# Patient Record
Sex: FEMALE | Race: WHITE
[De-identification: names, ages, dates, MRNs, and addresses within clinical notes are randomized per-mention and may not be internally consistent; named-entity substitution may affect disease eponyms.]

---

## 2017-03-09 ENCOUNTER — HOSPITAL ENCOUNTER (OUTPATIENT)
Dept: HOSPITAL 47 - LABPAT | Age: 62
Discharge: HOME | End: 2017-03-09
Payer: COMMERCIAL

## 2017-03-09 DIAGNOSIS — Z01.818: Primary | ICD-10-CM

## 2017-03-09 LAB
ALP SERPL-CCNC: 82 U/L (ref 38–126)
ALT SERPL-CCNC: 36 U/L (ref 9–52)
ANION GAP SERPL CALC-SCNC: 11 MMOL/L
APTT BLD: 24.5 SEC (ref 22–30)
AST SERPL-CCNC: 17 U/L (ref 14–36)
BASOPHILS # BLD AUTO: 0.1 K/UL (ref 0–0.2)
BASOPHILS NFR BLD AUTO: 1 %
BUN SERPL-SCNC: 27 MG/DL (ref 7–17)
CALCIUM SPEC-MCNC: 9.9 MG/DL (ref 8.4–10.2)
CH: 31.5
CHCM: 33.1
CHLORIDE SERPL-SCNC: 103 MMOL/L (ref 98–107)
CO2 SERPL-SCNC: 24 MMOL/L (ref 22–30)
EOSINOPHIL # BLD AUTO: 0.3 K/UL (ref 0–0.7)
EOSINOPHIL NFR BLD AUTO: 4 %
ERYTHROCYTE [DISTWIDTH] IN BLOOD BY AUTOMATED COUNT: 4.04 M/UL (ref 3.8–5.4)
ERYTHROCYTE [DISTWIDTH] IN BLOOD: 14.8 % (ref 11.5–15.5)
GLUCOSE SERPL-MCNC: 95 MG/DL (ref 74–99)
HCT VFR BLD AUTO: 38.7 % (ref 34–46)
HDW: 2.33
HGB BLD-MCNC: 12.7 GM/DL (ref 11.4–16)
INR PPP: 1.1 (ref ?–1.1)
LUC NFR BLD AUTO: 4 %
LYMPHOCYTES # SPEC AUTO: 1.4 K/UL (ref 1–4.8)
LYMPHOCYTES NFR SPEC AUTO: 22 %
MCH RBC QN AUTO: 31.4 PG (ref 25–35)
MCHC RBC AUTO-ENTMCNC: 32.8 G/DL (ref 31–37)
MCV RBC AUTO: 95.7 FL (ref 80–100)
MONOCYTES # BLD AUTO: 0.6 K/UL (ref 0–1)
MONOCYTES NFR BLD AUTO: 9 %
NEUTROPHILS # BLD AUTO: 3.8 K/UL (ref 1.3–7.7)
NEUTROPHILS NFR BLD AUTO: 60 %
NON-AFRICAN AMERICAN GFR(MDRD): 57
PH UR: 5.5 [PH] (ref 5–8)
POTASSIUM SERPL-SCNC: 4.2 MMOL/L (ref 3.5–5.1)
PROT SERPL-MCNC: 6.8 G/DL (ref 6.3–8.2)
PT BLD: 10.9 SEC (ref 9–12)
SODIUM SERPL-SCNC: 138 MMOL/L (ref 137–145)
SP GR UR: 1.01 (ref 1–1.03)
UA BILLING (MACRO VS. MICRO): (no result)
UROBILINOGEN UR QL STRIP: <2 MG/DL (ref ?–2)
WBC # BLD AUTO: 0.25 10*3/UL
WBC # BLD AUTO: 6.4 K/UL (ref 3.8–10.6)
WBC (PEROX): 6.64

## 2017-03-09 PROCEDURE — 87070 CULTURE OTHR SPECIMN AEROBIC: CPT

## 2017-03-09 PROCEDURE — 81003 URINALYSIS AUTO W/O SCOPE: CPT

## 2017-03-09 PROCEDURE — 85730 THROMBOPLASTIN TIME PARTIAL: CPT

## 2017-03-09 PROCEDURE — 80053 COMPREHEN METABOLIC PANEL: CPT

## 2017-03-09 PROCEDURE — 85610 PROTHROMBIN TIME: CPT

## 2017-03-09 PROCEDURE — 85025 COMPLETE CBC W/AUTO DIFF WBC: CPT

## 2017-03-09 PROCEDURE — 36415 COLL VENOUS BLD VENIPUNCTURE: CPT

## 2017-03-14 NOTE — CONS
DATE OF CONSULTATION:  



Mrs. Villareal is a 61-year-old female. She is scheduled for total right 

knee arthroplasty by Dr. Rene Espinoza  on March 21 at [facility]. Patient was seen in consultation. The patient does have 

history of hypertension and hyperlipidemia. There is also a history of 

an upper GI bleed back last September from a duodenal ulcer and 

history in the remote past of pseudotumor cerebri for which she had a 

previous lumbar peritoneal shunt placed. She does have underlying 

obesity.  



Her home medications include:

1. Hydrochlorothiazide 50 mg daily.

2. Protonix 40mg twice a day. 

3. Lisinopril 5 mg daily. 

4. Pravastatin 80 mg. 

5. Restasis eye drops 0.05% emulsion in both eyes twice a day. 



ALLERGIES: No known allergies. 



REVIEW OF SYSTEMS: Negative for any unusual headache, visual 

disturbances, nausea, vomiting. No urinary or bowel symptoms. No 

hematochezia. No unusual leg edema. Most of her symptoms related to 

the pain in the right knee and difficulty walking  



Previous surgical history includes a hysterectomy in the past, optic 

nerve decompression and a lumbar peritoneal shunt for pseudotumor cerebri.  



Family history is positive for coronary artery disease with both 

parents. There is also a history of diabetes and lymphoma in the 

father.  



SOCIAL HISTORY: She is a former smoker many years ago. No history of 

any excessive alcohol intake. She does live locally in the area and 

has employment driving a van.  



On physical examination, she is alert and oriented, in no acute 

distress. Her blood pressure is 144/80. She does weigh 223 pounds with 

a BMI of 40.9, pulse was 80 and regular.  

HEENT: Unremarkable. No carotid bruits, adenopathy or thyromegaly. 

Lungs were clear. Heart tones are regular without murmurs or rubs. 

Breasts and pelvic exam deferred. 

ABDOMEN: Obese, but soft and nontender without organomegaly or masses. 

Extremities revealed some trace pedal edema and degenerative changes 

about the right knee.  

Neurologically, she is intact. Cranial nerves intact. No focal 

neurological weakness.  



The patient did have laboratory values done, which revealed a blood 

sugar of 95, sodium 138, potassium 4.2. Her CO2 content was 24. BUN 

elevated at 27 with creatinine 1.0, giving her GFR of 57. Liver 

function tests were unremarkable. Albumin 4.2. INR is 1.1. Her white 

count of 6.4, with hemoglobin 12.7 and a platelet count 249. PTT was 

24.5. Urinalysis revealed negative leukocyte esterase. Nasal swab for 

MRSA is in progress at this dictation.  



Overall impression at this point: Patient was severe right knee 

derangement for surgery scheduled on the 21st of March. She does have 

a history of hypertension, underlying obesity, hyperlipidemia. Other 

past medical history with a previous history of a gastric ulcer last 

year likely NSAID induced at that time and remote history of 

pseudotumor cerebri for which she had a shunt placed. But at this 

point, cardiovascular status appears stable. I see no definite 

contraindication to planned surgery. She is to hold her 

hydrochlorothiazide the morning of surgery, although she usually takes 

it the night before. She can take a dose of Protonix and lisinopril 

with a small sip of water in the morning of surgery. Please call if 

any questions or concerns.  



TIFF

## 2017-03-21 ENCOUNTER — HOSPITAL ENCOUNTER (INPATIENT)
Dept: HOSPITAL 47 - 2ORMAIN | Age: 62
LOS: 2 days | Discharge: HOME | DRG: 470 | End: 2017-03-23
Payer: COMMERCIAL

## 2017-03-21 VITALS — BODY MASS INDEX: 40.6 KG/M2

## 2017-03-21 DIAGNOSIS — I10: ICD-10-CM

## 2017-03-21 DIAGNOSIS — Z79.899: ICD-10-CM

## 2017-03-21 DIAGNOSIS — E66.9: ICD-10-CM

## 2017-03-21 DIAGNOSIS — E78.5: ICD-10-CM

## 2017-03-21 DIAGNOSIS — Z87.891: ICD-10-CM

## 2017-03-21 DIAGNOSIS — Z87.11: ICD-10-CM

## 2017-03-21 DIAGNOSIS — M17.11: Primary | ICD-10-CM

## 2017-03-21 PROCEDURE — 0SRC0J9 REPLACEMENT OF RIGHT KNEE JOINT WITH SYNTHETIC SUBSTITUTE, CEMENTED, OPEN APPROACH: ICD-10-PCS

## 2017-03-21 PROCEDURE — 85025 COMPLETE CBC W/AUTO DIFF WBC: CPT

## 2017-03-21 PROCEDURE — 88300 SURGICAL PATH GROSS: CPT

## 2017-03-21 RX ADMIN — POTASSIUM CHLORIDE SCH MLS: 14.9 INJECTION, SOLUTION INTRAVENOUS at 15:10

## 2017-03-21 RX ADMIN — DOCUSATE SODIUM AND SENNOSIDES SCH EACH: 50; 8.6 TABLET ORAL at 20:34

## 2017-03-21 RX ADMIN — CYCLOSPORINE SCH DROPS: 0.5 EMULSION OPHTHALMIC at 20:36

## 2017-03-21 RX ADMIN — POTASSIUM CHLORIDE SCH MLS: 14.9 INJECTION, SOLUTION INTRAVENOUS at 12:52

## 2017-03-21 RX ADMIN — CEFAZOLIN SCH MLS/HR: 330 INJECTION, POWDER, FOR SOLUTION INTRAMUSCULAR; INTRAVENOUS at 17:34

## 2017-03-21 RX ADMIN — ASPIRIN 325 MG ORAL TABLET SCH MG: 325 PILL ORAL at 20:36

## 2017-03-21 NOTE — P.ONQ
Anesthesiology Proc Note - PNB





- Peripheral Nerve Block Performed


  ** Adductor Canal


Time Out Performed: Yes


Procedure Start Time: 13:13


Procedure Stop Time: 13:20


Indication: Acute Post-Operative Pain, Analgesia


Sedation Type: Sedate with meaningful contact maintained


Preparation: Sterile Prep


Position: Supine


Catheter: Indwelling


Needle Types: On-Q


Needle Size: 100mm (4")


Needle Gauge: 18


Technique: Ultrasound


Injectate: 0.5% Ropivacaine (see comment for volume)


Blood Aspirated: No


Pain Paresthesia on Injection Noted: No


Resistance on Injection: Normal


Events: Uneventful and Well Tolerated

## 2017-03-21 NOTE — P.OP
Date of Procedure: 03/21/17


Preoperative Diagnosis: 


Severe osteoarthritis right knee


Postoperative Diagnosis: 


Severe osteoarthritis right knee


Procedure(s) Performed: 


Right total knee arthroplasty


Implants: 


Smith and Nephew Oxinium femoral component size 4 right


Smith & Nephew Olivia II right nonporous tibial baseplate size 3


Smith & Nephew size 11 mm Legion XLPE dished articular insert, size 3-4


Smith & Nephew Olivia II resurfacing patellar component, 29 mm


All components were cemented using Maribeth bone cement..


The articulation is ceramic on polyethylene.


Anesthesia: spinal


Surgeon: Rene Espinoza


Assistant #1: Renee Francois


Estimated Blood Loss (ml): 50


Pathology: other (Bone and cartilage)


Condition: stable


Disposition: PACU


Indications for Procedure: 


After failure of conservative treatment we discussed the surgical and 

nonsurgical treatment options at length.  Patient wishes to proceed with a 

total knee arthroplasty.  Complications specific to this procedure were 

discussed at length, including but not limited to infection, bleeding, stiffness

, and nerve injury.  Patient is aware of all these complications and informed 

consent was obtained


Operative Findings: 


The operative findings are consistent with severe osteoarthritis of the right 

knee


Description of Procedure: 


Patient was seen in the preoperative area consent was reviewed and operative 

site was marked with a skin marker.  An adductor canal pain catheter was placed 

by anesthesia in the preoperative area.  Patient was then brought to the 

operating room and given preoperative antibiotics intravenously. A spinal 

anesthetic was administered by the anesthesia department.  A Ceron catheter was 

then placed by the nursing staff.  A tourniquet was placed on the upper thigh 

and the lower extremity was prepped and draped in usual sterile fashion.  A 

gram of transexamic acid was given.  A universal timeout was then performed 

which confirmed the patient's name, surgical site, ALLERGIES, and consent.





The lower extremity was then exsanguinated and tourniquet was inflated to 250 

mmHg.  A standard and anterior midline approach to the knee was performed.  The 

skin and subcutaneous tissue was dissected down to the patellar tendon.  A 

medial parapatellar arthrotomy was then performed.  The knee was then extended, 

the patellar was everted, and the knee was again flexed.  Anterior horns of 

both menisci were excised, and a release was performed to the posterior medial 

aspect of the knee.  On gross visual inspection, there was complete loss of 

articular cartilage in the medial and patellofemoral joint spaces.  There was 

also significant cartilage damage in the lateral compartment.  There were 

multiple periarticular osteophytes which were then removed with a Ronguer.  The 

femoral canal was then opened with the appropriate drill, and the 

intramedullary femoral cutting guide was then placed and set for 4 of valgus.  

The distal femoral cutting block was then pinned in place, and the distal femur 

was then cut.  The cutting block was then removed and the cut was checked for 

flatness.  Next, the sizing guide was then placed and set for 3 external 

rotation based off of the epicondylar axis and Whitesides line.  After the 

femur was sized, the appropriate 4-in-1 cutting block was then pinned in place.

  The anterior condyles were cut without notching.  The posterior and chamfer 

cuts were performed while protecting the collateral ligaments.  The cutting 

block was then removed, and the femoral canal was plugged with autologous bone.





Attention was then directed to the tibia.  The remaining ACL was removed with a 

Ronguer, and the tibia was then gently subluxed forward with a large bent knee 

retractor.  Any remaining menisci was excised.  The posterior lateral corner 

was cauterized in order to cauterize the lateral geniculate artery.  The extra 

medullary tibial cutting guide was then placed, set for the appropriate rotation

, slope, and depth of resection.  The proximal tibia cutting guide was then 

pinned in place.  Proximal tibia was then cut and sized.  Next trials were then 

placed with the appropriate-sized insert.  The knee was able to fully extend 

and flex to 130 and was stable throughout all range of motion.  The knee was 

then extended, patella everted.  Patella was then measured, and then using an 

osteotomy guide, the patella was cut at the appropriate level.  The patella was 

then measured and drilled and the patella trial was then placed.  The knee was 

then taken through range of motion with the patella trial and the patella 

tracked normally.  The knee was then extended patella trial was then removed 

and the patella was everted.  Knee was then flexed and lug holes were drilled 

through the femoral trial and the femoral trial was then removed.  The tibial 

was then exposed, and the tibial broach guide was then pinned in place after it 

was set for the appropriate rotation to allow for the most coverage without 

overhang.  The tibia was then reamed and broached.





The cut surfaces of bone were then irrigated with pulsatile lavage.  The 

posterior structures were injected with the ropivacaine solution.  The knee was 

also irrigated with Irrisept solution.  The components were then opened, the 

cement was mixed, and the components were then cemented in place.  The cement 

was allowed to harden with the knee in full extension.  While the cement was 

hardening, the remaining soft tissues were then injected with a ropivacaine 

solution, which consisted of 246.25 mg of ropivacaine, 0.5 mg of epinephrine, 

30 mg of Toradol, 80 g of clonidine, and 48.45 mL of sterile water, for a 

total of 100 mL of fluid injected. After the cemented hardened.  The tourniquet 

was released, and hemostasis was obtained.  A second gram of transexamic acid 

was given.  The knee was again irrigated.  The knee was again taken through 

range of motion and found to be stable throughout all range of motion of 0-130

, and the patella tracked normally.  The fascia was then closed with #2 strata 

fix suture.  The subcutaneous tissue was closed with 3-0 Vicryl and 3-0 strata 

fix.  Dermabond tape was used for the skin and placed with the knee in flexion. 

The patient was placed in a sterile dressing.  Patient was then transferred to 

recovery room in stable condition.





The assistant VIRAL Su was required due the complexity surgery and 

the need for a skilled surgical assistant.  She assisted in positioning, draping

, retraction, and closure of the wound.

## 2017-03-22 LAB
BASOPHILS # BLD AUTO: 0 K/UL (ref 0–0.2)
BASOPHILS NFR BLD AUTO: 0 %
CH: 30.5
CHCM: 32.4
EOSINOPHIL # BLD AUTO: 0 K/UL (ref 0–0.7)
EOSINOPHIL NFR BLD AUTO: 0 %
ERYTHROCYTE [DISTWIDTH] IN BLOOD BY AUTOMATED COUNT: 3.6 M/UL (ref 3.8–5.4)
ERYTHROCYTE [DISTWIDTH] IN BLOOD: 13.6 % (ref 11.5–15.5)
HCT VFR BLD AUTO: 34 % (ref 34–46)
HDW: 2.36
HGB BLD-MCNC: 10.9 GM/DL (ref 11.4–16)
LUC NFR BLD AUTO: 2 %
LYMPHOCYTES # SPEC AUTO: 1 K/UL (ref 1–4.8)
LYMPHOCYTES NFR SPEC AUTO: 9 %
MCH RBC QN AUTO: 30.3 PG (ref 25–35)
MCHC RBC AUTO-ENTMCNC: 32.2 G/DL (ref 31–37)
MCV RBC AUTO: 94.2 FL (ref 80–100)
MONOCYTES # BLD AUTO: 0.7 K/UL (ref 0–1)
MONOCYTES NFR BLD AUTO: 6 %
NEUTROPHILS # BLD AUTO: 10.1 K/UL (ref 1.3–7.7)
NEUTROPHILS NFR BLD AUTO: 84 %
WBC # BLD AUTO: 0.21 10*3/UL
WBC # BLD AUTO: 12.1 K/UL (ref 3.8–10.6)
WBC (PEROX): 13.21

## 2017-03-22 RX ADMIN — HYDROCODONE BITARTRATE AND ACETAMINOPHEN PRN EACH: 5; 325 TABLET ORAL at 15:29

## 2017-03-22 RX ADMIN — HYDROCODONE BITARTRATE AND ACETAMINOPHEN PRN EACH: 5; 325 TABLET ORAL at 10:32

## 2017-03-22 RX ADMIN — HYDROCODONE BITARTRATE AND ACETAMINOPHEN PRN EACH: 5; 325 TABLET ORAL at 05:28

## 2017-03-22 RX ADMIN — PRAVASTATIN SODIUM SCH MG: 80 TABLET ORAL at 07:14

## 2017-03-22 RX ADMIN — CEFAZOLIN SCH: 330 INJECTION, POWDER, FOR SOLUTION INTRAMUSCULAR; INTRAVENOUS at 05:36

## 2017-03-22 RX ADMIN — DOCUSATE SODIUM AND SENNOSIDES SCH: 50; 8.6 TABLET ORAL at 21:24

## 2017-03-22 RX ADMIN — CYCLOSPORINE SCH DROPS: 0.5 EMULSION OPHTHALMIC at 21:26

## 2017-03-22 RX ADMIN — PANTOPRAZOLE SODIUM SCH MG: 40 TABLET, DELAYED RELEASE ORAL at 07:14

## 2017-03-22 RX ADMIN — HYDROCODONE BITARTRATE AND ACETAMINOPHEN PRN EACH: 5; 325 TABLET ORAL at 05:57

## 2017-03-22 RX ADMIN — ASPIRIN 325 MG ORAL TABLET SCH MG: 325 PILL ORAL at 21:26

## 2017-03-22 RX ADMIN — ASPIRIN 325 MG ORAL TABLET SCH MG: 325 PILL ORAL at 08:56

## 2017-03-22 RX ADMIN — MELOXICAM SCH MG: 7.5 TABLET ORAL at 08:56

## 2017-03-22 RX ADMIN — CYCLOSPORINE SCH DROPS: 0.5 EMULSION OPHTHALMIC at 07:15

## 2017-03-22 RX ADMIN — HYDROCODONE BITARTRATE AND ACETAMINOPHEN PRN EACH: 5; 325 TABLET ORAL at 20:46

## 2017-03-22 NOTE — P.PN
Progress Note - Text





The patient is a 62-year-old female who underwent right total knee arthroplasty 

yesterday by Dr. Rene Espinoza.  Please refer to previous consultation.  

Patient does have comorbidities which consist of obesity, hypertension, 

hyperlipidemia.  This morning though she is sitting up in bed.  Alert.  Denies 

any chest pain, shortness of breath or nausea or vomiting.  She states overall 

her pain is controlled.





Her vital signs reveal temperature of 99.4 earlier this morning her pulse 97 

respirations 22.  Blood pressure 107/59 and she was 99% saturated on 2 L nasal 

cannula.


Lung and heart examination is clear and regular.


Abdomen is obese but soft and nontender.


Extremities reveal the presence of varicosities but no marked edema.


Cranial nerves intact.  She is alert.  Oriented.  No focal weakness noted.





Laboratory:


White count 12.1 with a hemoglobin 10.9 and a platelet count of 238.





Impressions and plans:


Patient is doing well first-day postop from the total right knee arthroplasty.  

Her last blood pressure was satisfactory but a little low.  We will hold her 

lisinopril and hydrochlorothiazide.  Can be advanced and progressed as per 

orthopedics.  I will follow.  Call if any questions or concerns.

## 2017-03-22 NOTE — P.DS
Providers


Date of admission: 


03/21/17 11:54





Expected date of discharge: 03/22/17


Attending physician: 


Rene Esipnoza





Consults: 





 





03/21/17 15:36


Consult Physician Routine 


   Consulting Provider: Rolo Mckeon Reason/Comments: medical management


   Do you want consulting provider notified?: Yes











Primary care physician: 


Rolo Mckeon








- Discharge Diagnosis(es)


(1) Status post total right knee replacement


Current Visit: Yes   Status: Acute   





(2) Primary osteoarthritis of right knee


Current Visit: Yes   Status: Acute   


Hospital Course: 





This is a pleasant 62-year-old female who was last seen in our office with 

complaints of right hip pain.  Patient has known history of degenerative 

arthritis of the right hip and presented to discuss options.  After discussion 

consideration the patient elected to proceed with a right total hip 

arthroplasty.  Patient was seen preoperatively medically cleared for surgery by 

her primary care physician.





Patient was admitted to [facility] and underwent right total hip 

arthroplasty.  The procedure was performed without complications or sequelae.  

The patient has done well postoperatively.   Pain has been are reasonably 

controlled.  She's not yet been up with physical therapy.  The patient has no 

new complaints today.  Patient is seen and evaluated at bedside with Dr. Rene Espinoza.  The patient appears comfortable and in no acute distress.  Dressing 

is clean dry and intact.  Incision is fine with no erythema or active drainage.

  Calf is soft and nontender.  The patient has good foot and ankle motion 

without difficulty.  Lower extremities are neurovascularly intact.  The patient 

is orthopedically stable for discharge if she does well with physical therapy 

today


Pertinent Studies: 





 Laboratory Tests











  03/22/17





  06:20


 


WBC  12.1 H


 


RBC  3.60 L


 


Hgb  10.9 L


 


Hct  34.0


 


MCV  94.2


 


MCH  30.3


 


MCHC  32.2


 


RDW  13.6


 


Plt Count  238


 


Neutrophils %  84


 


Lymphocytes %  9


 


Monocytes %  6


 


Eosinophils %  0


 


Basophils %  0


 


Neutrophils #  10.1 H


 


Lymphocytes #  1.0


 


Monocytes #  0.7


 


Eosinophils #  0.0


 


Basophils #  0.0











Patient Condition at Discharge: Good





Plan - Discharge Summary


New Discharge Prescriptions: 


Aspirin 325 mg PO BID #60 tab


Hydrocodone/Acetaminophen [Norco 5-325] 1 - 2 each PO Q6HR PRN #90 tab


 PRN Reason: Pain


Sennosides-Docusate Sodium [Senokot-S] 2 tab PO DAILY #60 tablet


Discharge Medication List





Hydrochlorothiazide [Hydrodiuril] 50 mg PO DAILY 09/19/16 [History]


Lisinopril [Zestril] 5 mg PO DAILY 09/19/16 [History]


Pravastatin Sodium [Pravachol] 80 mg PO DAILY 09/19/16 [History]


cycloSPORINE 0.05% OPHTH SOLN [Restasis] 1 drop BOTH EYES BID 09/19/16 [History]


Pantoprazole Sodium [Protonix] 40 mg PO DAILY 03/15/17 [History]


Aspirin 325 mg PO BID #60 tab 03/22/17 [Rx]


Hydrocodone/Acetaminophen [Norco 5-325] 1 - 2 each PO Q6HR PRN #90 tab 03/22/17 

[Rx]


Sennosides-Docusate Sodium [Senokot-S] 2 tab PO DAILY #60 tablet 03/22/17 [Rx]








Follow up Appointment(s)/Referral(s): 


Rene Espinoza DO [Doctor of Osteopathic Medicine] - 2 Weeks


Activity/Diet/Wound Care/Special Instructions: 


Weightbearing as tolerated with walker


Daily dressing changes


Keep incision clean and dry


Call [facility] with questions or concerns [phone]


Discharge Disposition: HOME WITH HOME HEALTH SERVICES

## 2017-03-22 NOTE — P.PN
Progress Note - Text





3/22  723am


62-year-old female status post total knee replacement by Dr. Rene bell.  

Patient seen this morning and evaluated for pain control, pain score of 2 pain 

on the anterior aspect of the knee,.  Doing well

## 2017-03-23 VITALS
DIASTOLIC BLOOD PRESSURE: 65 MMHG | SYSTOLIC BLOOD PRESSURE: 123 MMHG | TEMPERATURE: 98.3 F | RESPIRATION RATE: 17 BRPM | HEART RATE: 87 BPM

## 2017-03-23 RX ADMIN — PANTOPRAZOLE SODIUM SCH MG: 40 TABLET, DELAYED RELEASE ORAL at 07:19

## 2017-03-23 RX ADMIN — PRAVASTATIN SODIUM SCH MG: 80 TABLET ORAL at 07:18

## 2017-03-23 RX ADMIN — CYCLOSPORINE SCH DROPS: 0.5 EMULSION OPHTHALMIC at 07:19

## 2017-03-23 RX ADMIN — MELOXICAM SCH MG: 7.5 TABLET ORAL at 07:18

## 2017-03-23 RX ADMIN — POTASSIUM CHLORIDE SCH: 14.9 INJECTION, SOLUTION INTRAVENOUS at 02:49

## 2017-03-23 RX ADMIN — ASPIRIN 325 MG ORAL TABLET SCH MG: 325 PILL ORAL at 07:19

## 2017-03-23 RX ADMIN — HYDROCODONE BITARTRATE AND ACETAMINOPHEN PRN EACH: 5; 325 TABLET ORAL at 01:39

## 2017-03-23 RX ADMIN — HYDROCODONE BITARTRATE AND ACETAMINOPHEN PRN EACH: 5; 325 TABLET ORAL at 07:00

## 2017-03-23 NOTE — P.PN
Progress Note - Text





The patient is a 62-year-old female underwent right total knee arthroplasty by 

Dr. Rene Espinoza 2 days previous.  Patient is sitting up in bed, alert.  No 

chest pain or shortness of breath.  No nausea or vomiting.  Surgical site pain 

appears to be controlled.





Vital signs this morning reveal temperature 98.3 with a pulse of 87 

respirations 17.  Blood pressure is 123/65 and she is 95% saturated on room air.


Lung and heart exam was clear and regular.


Abdomen is nontender.


No unusual distal edema in the lower extremities or calf tenderness.


She is alert and oriented.  Cranial nerves intact.  No focal weakness noted.





Impressions and plans:


This lady doing well post left knee arthroplasty.  Anticipating discharge home 

today.  Patient may resume her home medications and she also monitors her blood 

pressures at home.  She can call office if any questions concerns or problems 

should arise.

## 2017-03-23 NOTE — P.PN
Progress Note - Text





This is a 62-year-old female who is status post total knee arthroplasty.  She 

is doing well from an orthopedic standpoint stay.  Her discharge was held 

yesterday for pain management.  She may be discharged to home today.  Please 

see previous discharge summary and orders.

## 2017-10-17 ENCOUNTER — HOSPITAL ENCOUNTER (OUTPATIENT)
Dept: HOSPITAL 47 - LABPAT | Age: 62
Discharge: HOME | End: 2017-10-17
Payer: COMMERCIAL

## 2017-10-17 DIAGNOSIS — Z01.810: Primary | ICD-10-CM

## 2017-10-17 DIAGNOSIS — Z01.812: ICD-10-CM

## 2017-10-17 LAB
ALP SERPL-CCNC: 87 U/L (ref 38–126)
ALT SERPL-CCNC: 32 U/L (ref 9–52)
ANION GAP SERPL CALC-SCNC: 12 MMOL/L
APTT BLD: 24.7 SEC (ref 22–30)
AST SERPL-CCNC: 16 U/L (ref 14–36)
BUN SERPL-SCNC: 28 MG/DL (ref 7–17)
CALCIUM SPEC-MCNC: 9.5 MG/DL (ref 8.4–10.2)
CH: 31.8
CHCM: 33
CHLORIDE SERPL-SCNC: 102 MMOL/L (ref 98–107)
CO2 SERPL-SCNC: 24 MMOL/L (ref 22–30)
EKG: (no result)
ERYTHROCYTE [DISTWIDTH] IN BLOOD BY AUTOMATED COUNT: 3.77 M/UL (ref 3.8–5.4)
ERYTHROCYTE [DISTWIDTH] IN BLOOD: 13.6 % (ref 11.5–15.5)
GLUCOSE SERPL-MCNC: 113 MG/DL (ref 74–99)
HCT VFR BLD AUTO: 36.5 % (ref 34–46)
HDW: 2.31
HGB BLD-MCNC: 11.8 GM/DL (ref 11.4–16)
INR PPP: 1.1 (ref ?–1.2)
MCH RBC QN AUTO: 31.2 PG (ref 25–35)
MCHC RBC AUTO-ENTMCNC: 32.2 G/DL (ref 31–37)
MCV RBC AUTO: 96.9 FL (ref 80–100)
NON-AFRICAN AMERICAN GFR(MDRD): 41
PARTICLE COUNT: 757
PH UR: 5.5 [PH] (ref 5–8)
POTASSIUM SERPL-SCNC: 4.1 MMOL/L (ref 3.5–5.1)
PROT SERPL-MCNC: 6.5 G/DL (ref 6.3–8.2)
PT BLD: 11 SEC (ref 9–12)
RBC UR QL: 1 /HPF (ref 0–5)
SODIUM SERPL-SCNC: 138 MMOL/L (ref 137–145)
SP GR UR: 1.01 (ref 1–1.03)
SQUAMOUS UR QL AUTO: 1 /HPF (ref 0–4)
UA BILLING (MACRO VS. MICRO): (no result)
UROBILINOGEN UR QL STRIP: <2 MG/DL (ref ?–2)
WBC # BLD AUTO: 7.5 K/UL (ref 3.8–10.6)
WBC #/AREA URNS HPF: 1 /HPF (ref 0–5)

## 2017-10-17 PROCEDURE — 81001 URINALYSIS AUTO W/SCOPE: CPT

## 2017-10-17 PROCEDURE — 85610 PROTHROMBIN TIME: CPT

## 2017-10-17 PROCEDURE — 36415 COLL VENOUS BLD VENIPUNCTURE: CPT

## 2017-10-17 PROCEDURE — 80053 COMPREHEN METABOLIC PANEL: CPT

## 2017-10-17 PROCEDURE — 85027 COMPLETE CBC AUTOMATED: CPT

## 2017-10-17 PROCEDURE — 85730 THROMBOPLASTIN TIME PARTIAL: CPT

## 2017-10-17 PROCEDURE — 93005 ELECTROCARDIOGRAM TRACING: CPT

## 2017-10-17 PROCEDURE — 87070 CULTURE OTHR SPECIMN AEROBIC: CPT

## 2017-10-29 NOTE — CONS
CONSULTATION



REASON FOR CONSULTATION:

Preop consultation.





Mrs. Villareal is scheduled for a left knee arthroplasty by Dr. Rene Bunchoff November 7

at [facility].  We were asked to see you for preop consultation.  The patient has

had problems with the chronic left knee pain that has been worsening.



OTHER PAST MEDICAL HISTORY:

Is positive for hypertension, hyperlipidemia, gastroesophageal reflux, there is a

previous history of benign intracranial hypertension and she also has underlying

obesity.



REVIEW OF SYSTEMS:

Was negative for any unusual headaches, nausea, vomiting.  No urinary or bowel

symptoms.  No hematemesis or hematochezia.  No unusual edema.



FAMILY HISTORY:

Positive for heart disease.  There is a history of diabetes in her mother and father

has a history of non-Hodgkin's lymphoma.



SOCIAL HISTORY:

Negative for smoking or any excessive alcohol intake.  She does work in the

transportation specter with driving a van.



PHYSICAL EXAMINATION:

She is alert and oriented and pleasant in no acute distress.  Vital signs revealed

blood pressure 138/80, pulse of 76 and regular.  HEENT:  Unremarkable.  NECK:  Supple.

Lungs were clear to auscultation and percussion.  Heart tones were regular.  ABDOMEN:

Obese, but nontender.  Extremities revealed no unusual edema.  No focal neurological

deficits noted.  The patient did have laboratory evaluation which revealed a blood

sugar of 113, potassium 4.1, BUN of 28 with creatinine 1.23 cm, GFR 41.  Other liver

function tests were unremarkable.  Her INR is 1.1.  White count is 7.5, the hemoglobin

11.8, and platelet count of 268.  Urinalysis only revealed 1 white cell,  and 1 red

cell.



Overall this 62-year-old female does have underlying history of hypertension,

hyperlipidemia, gastroesophageal reflux disease.  There is a remote history of benign

intracranial hypertension and also history of blood clots more than 30 years ago

without evidence of any recurrence.  Basically, her cardiovascular system is stable.

She is on medications including Protonix, hydrochlorothiazide, lisinopril for blood

pressure.  Pravastatin for her cholesterol and Restasis for her dry eyes.  At this

point, I see no definite contraindication to the planned surgical intervention.  I did

recommend that the patient not take her diuretic the morning of her surgery, can take

her lisinopril with a small sip of water.  Once again at this time the patient is

cleared for the left knee surgery.  Please call if any questions concerns or problems.





MMODL / IJN: [id number] / Job#: [id number]

## 2017-11-07 ENCOUNTER — HOSPITAL ENCOUNTER (INPATIENT)
Dept: HOSPITAL 47 - 2ORMAIN | Age: 62
LOS: 2 days | Discharge: HOME | DRG: 470 | End: 2017-11-09
Payer: COMMERCIAL

## 2017-11-07 VITALS — BODY MASS INDEX: 41.5 KG/M2

## 2017-11-07 DIAGNOSIS — K21.9: ICD-10-CM

## 2017-11-07 DIAGNOSIS — I10: ICD-10-CM

## 2017-11-07 DIAGNOSIS — Z87.891: ICD-10-CM

## 2017-11-07 DIAGNOSIS — K25.9: ICD-10-CM

## 2017-11-07 DIAGNOSIS — E66.9: ICD-10-CM

## 2017-11-07 DIAGNOSIS — E78.5: ICD-10-CM

## 2017-11-07 DIAGNOSIS — Z82.49: ICD-10-CM

## 2017-11-07 DIAGNOSIS — Z96.651: ICD-10-CM

## 2017-11-07 DIAGNOSIS — Z83.3: ICD-10-CM

## 2017-11-07 DIAGNOSIS — M17.12: Primary | ICD-10-CM

## 2017-11-07 DIAGNOSIS — Z79.899: ICD-10-CM

## 2017-11-07 PROCEDURE — 0SRD069 REPLACEMENT OF LEFT KNEE JOINT WITH OXIDIZED ZIRCONIUM ON POLYETHYLENE SYNTHETIC SUBSTITUTE, CEMENTED, OPEN APPROACH: ICD-10-PCS

## 2017-11-07 PROCEDURE — 80048 BASIC METABOLIC PNL TOTAL CA: CPT

## 2017-11-07 PROCEDURE — 85025 COMPLETE CBC W/AUTO DIFF WBC: CPT

## 2017-11-07 PROCEDURE — 88300 SURGICAL PATH GROSS: CPT

## 2017-11-07 RX ADMIN — PANTOPRAZOLE SODIUM SCH: 40 TABLET, DELAYED RELEASE ORAL at 18:54

## 2017-11-07 RX ADMIN — CEFAZOLIN SCH: 330 INJECTION, POWDER, FOR SOLUTION INTRAMUSCULAR; INTRAVENOUS at 22:12

## 2017-11-07 RX ADMIN — POTASSIUM CHLORIDE SCH MLS: 14.9 INJECTION, SOLUTION INTRAVENOUS at 12:03

## 2017-11-07 RX ADMIN — ASPIRIN 325 MG ORAL TABLET SCH MG: 325 PILL ORAL at 22:12

## 2017-11-07 RX ADMIN — DOCUSATE SODIUM AND SENNOSIDES SCH: 50; 8.6 TABLET ORAL at 22:12

## 2017-11-07 RX ADMIN — CYCLOSPORINE SCH DROPS: 0.5 EMULSION OPHTHALMIC at 22:11

## 2017-11-07 NOTE — XR
EXAMINATION TYPE: XR knee limited LT

 

DATE OF EXAM: 11/7/2017

 

COMPARISON: NONE

 

HISTORY: Postop left knee replacement

 

TECHNIQUE: 2 views left knee

 

FINDINGS: No acute fractures are evident. Femoral and tibial components of in place. No acute fractur
es are evident. Postsurgical changes are within soft tissues.

 

IMPRESSION:

1.  No acute fracture post left knee replacement

## 2017-11-07 NOTE — P.OP
Date of Procedure: 11/07/17


Preoperative Diagnosis: 


Severe osteoarthritis left knee


Postoperative Diagnosis: 


Severe osteoarthritis left knee


Procedure(s) Performed: 


Left total knee arthroplasty


Implants: 


Smith and Nephew Oxinium femoral component size 4, left


Smith & Nephew Olivia II left nonporous tibial baseplate size 3


Smith & Nephew size 15 mm Legion XLPE dished articular insert, size 3-4


Smith & Nephew Olivia II resurfacing patellar component, 29 mm


All components were cemented using Maribeth bone cement..


The articulation is Oxinium on polyethylene.


Anesthesia: spinal


Surgeon: Rene Espinoza


Assistant #1: Sheyla Caldwell


Estimated Blood Loss (ml): 50


Pathology: other (Bone and cartilage)


Condition: stable


Disposition: PACU


Indications for Procedure: 


After failure of conservative treatment we discussed the surgical and 

nonsurgical treatment options at length.  Patient wishes to proceed with a 

total knee arthroplasty.  Complications specific to this procedure were 

discussed at length, including but not limited to infection, bleeding, stiffness

, and nerve injury.  Patient is aware of all these complications and informed 

consent was obtained


Operative Findings: 


The operative findings are consistent with severe osteoarthritis of the left 

knee


Description of Procedure: 


Patient was seen in the preoperative area consent was reviewed and operative 

site was marked with a skin marker.  An adductor canal pain catheter was placed 

by anesthesia in the preoperative area.  Patient was then brought to the 

operating room and given preoperative antibiotics intravenously. A spinal 

anesthetic was administered by the anesthesia department.  A tourniquet was 

placed on the upper thigh and the lower extremity was prepped and draped in 

usual sterile fashion.  A gram of transexamic acid was given.  A universal 

timeout was then performed which confirmed the patient's name, surgical site, 

ALLERGIES, and consent.





The lower extremity was then exsanguinated and tourniquet was inflated to 250 

mmHg.  A standard and anterior midline approach to the knee was performed.  The 

skin and subcutaneous tissue was dissected down to the patellar tendon.  A 

medial parapatellar arthrotomy was then performed.  The knee was then extended, 

the patellar was everted, and the knee was again flexed.  Anterior horns of 

both menisci were excised, and a release was performed to the posterior medial 

aspect of the knee.  On gross visual inspection, there was complete loss of 

articular cartilage in the medial and patellofemoral joint spaces.  There was 

also significant cartilage damage in the lateral compartment.  There were 

multiple periarticular osteophytes which were then removed with a Ronguer.  The 

femoral canal was then opened with the appropriate drill, and the 

intramedullary femoral cutting guide was then placed and set for 4 of valgus.  

The distal femoral cutting block was then pinned in place, and the distal femur 

was then cut.  The cutting block was then removed and the cut was checked for 

flatness.  Next, the sizing guide was then placed and set for 3 external 

rotation based off of the epicondylar axis and Whitesides line.  After the 

femur was sized, the appropriate 4-in-1 cutting block was then pinned in place.

  The anterior condyles were cut without notching.  The posterior and chamfer 

cuts were performed while protecting the collateral ligaments.  The cutting 

block was then removed, and the femoral canal was plugged with autologous bone.





Attention was then directed to the tibia.  The remaining ACL was removed with a 

Ronguer, and the tibia was then gently subluxed forward with a large bent knee 

retractor.  Any remaining menisci was excised.  The posterior lateral corner 

was cauterized in order to cauterize the lateral geniculate artery.  The extra 

medullary tibial cutting guide was then placed, set for the appropriate rotation

, slope, and depth of resection.  The proximal tibia cutting guide was then 

pinned in place.  Proximal tibia was then cut and sized.  Next trials were then 

placed with the appropriate-sized insert.  The knee was able to fully extend 

and flex to 130 and was stable throughout all range of motion.  The knee was 

then extended, patella everted.  Patella was then measured, and then using an 

osteotomy guide, the patella was cut at the appropriate level.  The patella was 

then measured and drilled and the patella trial was then placed.  The knee was 

then taken through range of motion with the patella trial and the patella 

tracked normally.  The knee was then extended patella trial was then removed 

and the patella was everted.  Knee was then flexed and lug holes were drilled 

through the femoral trial and the femoral trial was then removed.  The tibial 

was then exposed, and the tibial broach guide was then pinned in place after it 

was set for the appropriate rotation to allow for the most coverage without 

overhang.  The tibia was then reamed and broached.





The cut surfaces of bone were then irrigated with pulsatile lavage.  The 

posterior structures were injected with the ropivacaine solution.  The knee was 

also irrigated with Irrisept solution.  The components were then opened, the 

cement was mixed, and the components were then cemented in place.  The cement 

was allowed to harden with the knee in full extension.  While the cement was 

hardening, the remaining soft tissues were then injected with a ropivacaine 

solution, which consisted of 246.25 mg of ropivacaine, 0.5 mg of epinephrine, 

30 mg of Toradol, 80 g of clonidine, and 48.45 mL of sterile water, for a 

total of 100 mL of fluid injected. After the cemented hardened.  The tourniquet 

was released, and hemostasis was obtained.  A second gram of transexamic acid 

was given.  The knee was again irrigated.  The knee was again taken through 

range of motion and found to be stable throughout all range of motion of 0-130

, and the patella tracked normally.  The fascia was then closed with #2 strata 

fix suture.  The subcutaneous tissue was closed with 3-0 Vicryl and 3-0 strata 

fix.  Dermabond glue was used for the skin and placed with the knee in flexion. 

The patient was placed in a sterile silver dressing.  Patient was then 

transferred to recovery room in stable condition.





The assistant VIRAL Alberto was required due the complexity surgery and the 

need for a skilled surgical assistant.  She assisted in positioning, draping, 

retraction, and closure of the wound.

## 2017-11-08 VITALS — RESPIRATION RATE: 16 BRPM

## 2017-11-08 LAB
ANION GAP SERPL CALC-SCNC: 8 MMOL/L
BASOPHILS # BLD AUTO: 0 K/UL (ref 0–0.2)
BASOPHILS NFR BLD AUTO: 0 %
BUN SERPL-SCNC: 17 MG/DL (ref 7–17)
CALCIUM SPEC-MCNC: 9.5 MG/DL (ref 8.4–10.2)
CH: 30.9
CHCM: 32.1
CHLORIDE SERPL-SCNC: 102 MMOL/L (ref 98–107)
CO2 SERPL-SCNC: 26 MMOL/L (ref 22–30)
EOSINOPHIL # BLD AUTO: 0.1 K/UL (ref 0–0.7)
EOSINOPHIL NFR BLD AUTO: 0 %
ERYTHROCYTE [DISTWIDTH] IN BLOOD BY AUTOMATED COUNT: 3.85 M/UL (ref 3.8–5.4)
ERYTHROCYTE [DISTWIDTH] IN BLOOD: 12.4 % (ref 11.5–15.5)
GLUCOSE SERPL-MCNC: 136 MG/DL (ref 74–99)
HCT VFR BLD AUTO: 37.2 % (ref 34–46)
HDW: 2.29
HGB BLD-MCNC: 11.5 GM/DL (ref 11.4–16)
LUC NFR BLD AUTO: 1 %
LYMPHOCYTES # SPEC AUTO: 1.1 K/UL (ref 1–4.8)
LYMPHOCYTES NFR SPEC AUTO: 8 %
MCH RBC QN AUTO: 30 PG (ref 25–35)
MCHC RBC AUTO-ENTMCNC: 31 G/DL (ref 31–37)
MCV RBC AUTO: 96.7 FL (ref 80–100)
MONOCYTES # BLD AUTO: 0.8 K/UL (ref 0–1)
MONOCYTES NFR BLD AUTO: 6 %
NEUTROPHILS # BLD AUTO: 11.7 K/UL (ref 1.3–7.7)
NEUTROPHILS NFR BLD AUTO: 85 %
NON-AFRICAN AMERICAN GFR(MDRD): >60
POTASSIUM SERPL-SCNC: 4.1 MMOL/L (ref 3.5–5.1)
SODIUM SERPL-SCNC: 136 MMOL/L (ref 137–145)
WBC # BLD AUTO: 0.13 10*3/UL
WBC # BLD AUTO: 13.7 K/UL (ref 3.8–10.6)
WBC (PEROX): 13.85

## 2017-11-08 RX ADMIN — ASPIRIN 325 MG ORAL TABLET SCH MG: 325 PILL ORAL at 21:36

## 2017-11-08 RX ADMIN — ASPIRIN 325 MG ORAL TABLET SCH MG: 325 PILL ORAL at 09:16

## 2017-11-08 RX ADMIN — CYCLOSPORINE SCH DROPS: 0.5 EMULSION OPHTHALMIC at 09:16

## 2017-11-08 RX ADMIN — LISINOPRIL SCH MG: 5 TABLET ORAL at 09:16

## 2017-11-08 RX ADMIN — DOCUSATE SODIUM AND SENNOSIDES SCH EACH: 50; 8.6 TABLET ORAL at 21:38

## 2017-11-08 RX ADMIN — MELOXICAM SCH MG: 7.5 TABLET ORAL at 09:17

## 2017-11-08 RX ADMIN — CYCLOSPORINE SCH DROPS: 0.5 EMULSION OPHTHALMIC at 21:36

## 2017-11-08 RX ADMIN — HYDROCODONE BITARTRATE AND ACETAMINOPHEN PRN EACH: 5; 325 TABLET ORAL at 04:27

## 2017-11-08 RX ADMIN — PANTOPRAZOLE SODIUM SCH MG: 40 TABLET, DELAYED RELEASE ORAL at 09:16

## 2017-11-08 RX ADMIN — HYDROCODONE BITARTRATE AND ACETAMINOPHEN PRN EACH: 5; 325 TABLET ORAL at 21:15

## 2017-11-08 RX ADMIN — PRAVASTATIN SODIUM SCH MG: 80 TABLET ORAL at 09:16

## 2017-11-08 RX ADMIN — CEFAZOLIN SCH: 330 INJECTION, POWDER, FOR SOLUTION INTRAMUSCULAR; INTRAVENOUS at 05:56

## 2017-11-08 RX ADMIN — PANTOPRAZOLE SODIUM SCH MG: 40 TABLET, DELAYED RELEASE ORAL at 21:36

## 2017-11-08 RX ADMIN — HYDROCODONE BITARTRATE AND ACETAMINOPHEN PRN EACH: 5; 325 TABLET ORAL at 15:52

## 2017-11-08 RX ADMIN — POTASSIUM CHLORIDE SCH: 14.9 INJECTION, SOLUTION INTRAVENOUS at 05:56

## 2017-11-08 RX ADMIN — CEFAZOLIN SCH GM: 10 INJECTION, POWDER, FOR SOLUTION INTRAVENOUS at 09:16

## 2017-11-08 RX ADMIN — CEFAZOLIN SCH: 330 INJECTION, POWDER, FOR SOLUTION INTRAMUSCULAR; INTRAVENOUS at 14:23

## 2017-11-08 RX ADMIN — CEFAZOLIN SCH GM: 10 INJECTION, POWDER, FOR SOLUTION INTRAVENOUS at 00:10

## 2017-11-08 RX ADMIN — HYDROCODONE BITARTRATE AND ACETAMINOPHEN PRN EACH: 5; 325 TABLET ORAL at 10:49

## 2017-11-08 RX ADMIN — HYDROCHLOROTHIAZIDE SCH MG: 50 TABLET ORAL at 09:17

## 2017-11-08 NOTE — P.PN
Subjective


Progress Note Date: 11/08/17


This is a 62-year-old female who is status post left total knee arthroplasty.  

This is postoperative day #1.  Patient is seen and evaluated at bedside with 

Dr. Rene Espinoza.  Patient states her pain is well controlled and she is 

doing well with physical therapy.  Patient has no new complaints today.








Objective





- Vital Signs


Vital signs: 


 Vital Signs











Temp  98.2 F   11/08/17 08:15


 


Pulse  88   11/08/17 08:15


 


Resp  18   11/08/17 08:15


 


BP  131/82   11/08/17 08:15


 


Pulse Ox  96   11/08/17 08:15








 Intake & Output











 11/07/17 11/08/17 11/08/17





 18:59 06:59 18:59


 


Intake Total 1601 715 180


 


Output Total 50  


 


Balance 1551 715 180


 


Weight 99.79 kg  


 


Intake:   


 


  IV 1601  


 


  Intake, IV Titration  715 





  Amount   


 


    Sodium Chloride 0.9% 1,  715 





    000 ml @ 65 mls/hr IV .   





    [id number] NICOLASA Rx#:[id number]   


 


  Oral   180


 


Output:   


 


  Estimated Blood Loss 50  


 


Other:   


 


  Voiding Method  Toilet 


 


  # Voids  1 














- Exam


Vital signs are stable.  Patient is in no acute distress and is alert and 

oriented 3.  Calf is soft and nontender.  Dressing is clean, dry, and intact.  

Neurovascular status intact.  Patient has full foot and ankle motion.  








- Labs


CBC & Chem 7: 


 11/08/17 07:18





 11/08/17 07:18


Labs: 


 Abnormal Lab Results - Last 24 Hours (Table)











  11/08/17 11/08/17 Range/Units





  07:18 07:18 


 


WBC  13.7 H   (3.8-10.6)  k/uL


 


Neutrophils #  11.7 H   (1.3-7.7)  k/uL


 


Sodium   136 L  (137-145)  mmol/L


 


Glucose   136 H  (74-99)  mg/dL














Assessment and Plan


(1) Primary osteoarthritis of left knee


Current Visit: Yes   Status: Acute   Code(s): M17.12 - UNILATERAL PRIMARY 

OSTEOARTHRITIS, LEFT KNEE   SNOMED Code(s): 058273450


   





(2) S/P total knee arthroplasty


Current Visit: Yes   Status: Acute   Code(s): Z96.659 - PRESENCE OF UNSPECIFIED 

ARTIFICIAL KNEE JOINT   SNOMED Code(s): 7092887803633


   


Plan: 


#1 Continue with routine postoperative care. 


#2 Anticoagulation with aspirin.  


#3 Physical therapy and CPM today. 


#4 Appreciate input from medicine. 


#5 Anticipate discharge home with home care likely tomorrow.

## 2017-11-08 NOTE — P.PN
Progress Note - Text


The patient is status post left adductor canal catheter placement.  The 

catheter was placed for postoperative pain control, status post total left 

arthroplasty.  Ropivacaine 0.2% is infusing at 8 mLs per hour.  The patient has 

no complaints  of left lower extremity numbness or weakness.  Patient's VAS 

score is 0-10.   Assessment: Patient's adductor canal catheter is in place and 

working appropriately.  Plan: continue infusion and adjust it as needed.

## 2017-11-08 NOTE — P.PN
Progress Note - Text





The patient is a 62-year-old female who yesterday underwent a left total knee 

arthroplasty.  Patient has underlying history of obesity, hypertension, 

hyperlipidemia, gastroesophageal reflux.  There is also a remote history of 

benign intracranial hypertension.





This morning though she is sitting up in bed.  Alert.  Denies any chest pain or 

shortness of breath.  No nausea or vomiting.  She states she's been able to get 

up on the left knee.





Vital signs reveal temperature 98.2 with a pulse of 88 respirations 18.  Blood 

pressure 131/82 and she is 96% saturated.


Lung and heart examination is clear and regular.


Abdomen nontender.


The left knee wound looks clean.


No focal neurological deficits.





Laboratory


White count is 13.7 with a hemoglobin 11.5 and a platelet count of 278.


Sodium was 136 with potassium 4.1.  Her GFR is greater than 60.  Blood sugar 

this morning slightly high at 136.





Impressions and plans:


This is a 62-year-old female status post left knee surgery yesterday and 

comorbidities, medical concerns as listed above appears overall to be doing 

well and can be progressed as per orthopedics.  Medications have been continued 

for control of her underlying medical concerns that I list.  Please call if any 

questions or concerns.

## 2017-11-09 VITALS — HEART RATE: 82 BPM | DIASTOLIC BLOOD PRESSURE: 79 MMHG | SYSTOLIC BLOOD PRESSURE: 128 MMHG | TEMPERATURE: 98.3 F

## 2017-11-09 RX ADMIN — ASPIRIN 325 MG ORAL TABLET SCH MG: 325 PILL ORAL at 08:12

## 2017-11-09 RX ADMIN — MELOXICAM SCH MG: 7.5 TABLET ORAL at 08:11

## 2017-11-09 RX ADMIN — HYDROCODONE BITARTRATE AND ACETAMINOPHEN PRN EACH: 5; 325 TABLET ORAL at 08:18

## 2017-11-09 RX ADMIN — PANTOPRAZOLE SODIUM SCH MG: 40 TABLET, DELAYED RELEASE ORAL at 08:11

## 2017-11-09 RX ADMIN — HYDROCHLOROTHIAZIDE SCH MG: 50 TABLET ORAL at 08:11

## 2017-11-09 RX ADMIN — PRAVASTATIN SODIUM SCH MG: 80 TABLET ORAL at 08:13

## 2017-11-09 RX ADMIN — HYDROCODONE BITARTRATE AND ACETAMINOPHEN PRN EACH: 5; 325 TABLET ORAL at 13:34

## 2017-11-09 RX ADMIN — HYDROCODONE BITARTRATE AND ACETAMINOPHEN PRN EACH: 5; 325 TABLET ORAL at 03:14

## 2017-11-09 RX ADMIN — LISINOPRIL SCH MG: 5 TABLET ORAL at 08:12

## 2017-11-09 RX ADMIN — CYCLOSPORINE SCH DROPS: 0.5 EMULSION OPHTHALMIC at 08:12

## 2017-11-09 NOTE — P.PN
Progress Note - Text





The patient is a 62-year-old female who underwent left total knee arthroplasty 

2 days previous.  Patient does have underlying comorbidities which include 

obesity, hypertension, hyperlipidemia and gastroesophageal reflux.


The patient is sitting up in bed.  No complaints of chest pain or shortness of 

breath.  No nausea or vomiting.





Vital signs reveal temperature 98.4 with a pulse of 79 and respirations 16.  

Blood pressure is 131/61 and she is 99% saturated on room air.


Clinical examination unchanged.





No new lab testing and noted.





Patient has been up with physical therapy and ambulating in the hallways.





Impressions and plan:


Discussed with patient at bedside along with nursing staff.  Possible discharge 

today if okay with orthopedics.  The patient will continue her previous home 

medications as outlined in her medical consultation.  Office follow-up as 

deemed necessary.  Patient will call if any questions, concerns or problems 

develop.  Also follow-up as needed with orthopedics.

## 2017-11-09 NOTE — P.PN
Progress Note - Text





0632 Anesthesia POD 2.  Patient is status post left TKR under spinal anesthesia 

with a left adductor canal catheter placed for postoperative pain relief.  With 

ropivacaine 0.2% running at 12 cc's per hour, the patient's VAS is (0, 3).  

Catheter site is clean dry and intact.

## 2017-11-09 NOTE — P.DS
Providers


Date of admission: 


11/07/17 11:33





Expected date of discharge: 11/09/17


Attending physician: 


Rene Espinoza





Consults: 





 





11/07/17 13:54


Consult Physician Routine 


   Consulting Provider: Rolo Mckeon Reason/Comments: medical management


   Do you want consulting provider notified?: Yes











Primary care physician: 


Rolo Mckeon








- Discharge Diagnosis(es)


(1) Primary osteoarthritis of left knee


Current Visit: Yes   Status: Acute   





(2) S/P total knee arthroplasty


Current Visit: Yes   Status: Acute   


Hospital Course: 


This is a 62-year-old female with known history of degenerative arthritis of 

the left knee.  The patient presents for evaluation.  After discussion and 

consideration patient elects to proceed with total knee arthroplasty.  The 

patient is seen preoperatively by Dr. Espinoza and cleared for surgery.





Patient is admitted to [facility] on 11/07/2017 for total knee 

arthroplasty.  The procedures performed without complication or sequelae.  The 

patient is doing well postoperatively.  Labs and vital signs are stable on day 

of discharge.





On day of discharge patient's knee incision is healing well.  There is minimal 

erythema.  There is no drainage noted at this time.  There is minimal soft 

tissue swelling to the knee.  Patient has full foot and ankle motion without 

difficulty or pain.  Neurovascular status to the left lower extremity is 

intact.  Patient is discharged home in good condition. Please see med rec for 

accurate list of home medications.








Plan - Discharge Summary


Discharge Rx Participant: Yes


New Discharge Prescriptions: 


New


   Aspirin 325 mg PO BID #60 tab


   HYDROcodone/APAP 5-325MG [Norco 5-325] 1 - 2 tab PO Q4-6H PRN #90 tab


     PRN Reason: Pain


   Sennosides-Docusate Sodium [Senokot-S] 1 tab PO BID #60 tablet





No Action


   cycloSPORINE 0.05% OPHTH SOLN [Restasis] 1 drop BOTH EYES BID


   Pravastatin Sodium [Pravachol] 80 mg PO DAILY


   Lisinopril [Zestril] 5 mg PO DAILY


   Hydrochlorothiazide [Hydrodiuril] 50 mg PO DAILY


   Pantoprazole Sodium [Protonix] 40 mg PO BID


   Ibuprofen [Motrin] 200 - 400 mg PO Q6HR PRN


     PRN Reason: Pain


   Acetaminophen [Tylenol Arthritis] 650 mg PO Q12H PRN


     PRN Reason: Pain


Discharge Medication List





Hydrochlorothiazide [Hydrodiuril] 50 mg PO DAILY 09/19/16 [History]


Lisinopril [Zestril] 5 mg PO DAILY 09/19/16 [History]


Pravastatin Sodium [Pravachol] 80 mg PO DAILY 09/19/16 [History]


cycloSPORINE 0.05% OPHTH SOLN [Restasis] 1 drop BOTH EYES BID 09/19/16 [History]


Pantoprazole Sodium [Protonix] 40 mg PO BID 03/15/17 [History]


Acetaminophen [Tylenol Arthritis] 650 mg PO Q12H PRN 10/31/17 [History]


Ibuprofen [Motrin] 200 - 400 mg PO Q6HR PRN 10/31/17 [History]


Aspirin 325 mg PO BID #60 tab 11/09/17 [Rx]


HYDROcodone/APAP 5-325MG [Norco 5-325] 1 - 2 tab PO Q4-6H PRN #90 tab 11/09/17 [

Rx]


Sennosides-Docusate Sodium [Senokot-S] 1 tab PO BID #60 tablet 11/09/17 [Rx]








Ambulatory/Diagnostic Orders: 


Continuous Passive Motion (CPM) Machine [DME.AMB1] Time Frame: 3 Weeks, Location

: Determined By Patient


Ambulatory Physical Therapy Order [THER.AMB] Location: Determined By Patient

## 2019-02-08 NOTE — P.ONQ
Anesthesiology Proc Note - PNB





- Peripheral Nerve Block Performed


  ** Left Adductor Canal


Indication: Acute Post-Operative Pain, Requested by physician (Dr Rene Espinoza

)


Sedation Type: Sedate with meaningful contact maintained


Preparation: Sterile Dressing


Position: Supine


Catheter: Indwelling


Needle Types: Other (see comment) (Nunu)


Needle Size: 100mm (4")


Needle Gauge: 20


Technique: Ultrasound


Injectate: 0.5% Ropivacaine (see comment for volume) (20cc)


Blood Aspirated: No


Pain Paresthesia on Injection Noted: No


Resistance on Injection: Normal


Events: Uneventful and Well Tolerated
Yes

## 2023-05-01 ENCOUNTER — HOSPITAL ENCOUNTER (OUTPATIENT)
Dept: HOSPITAL 47 - RADMAMWWP | Age: 68
End: 2023-05-01
Attending: INTERNAL MEDICINE
Payer: COMMERCIAL

## 2023-05-01 DIAGNOSIS — E83.52: ICD-10-CM

## 2023-05-01 DIAGNOSIS — Z13.820: ICD-10-CM

## 2023-05-01 DIAGNOSIS — Z12.31: Primary | ICD-10-CM

## 2023-05-01 DIAGNOSIS — M85.89: ICD-10-CM

## 2023-05-01 DIAGNOSIS — N28.9: ICD-10-CM

## 2023-05-01 DIAGNOSIS — Z90.710: ICD-10-CM

## 2023-05-01 DIAGNOSIS — Z78.0: ICD-10-CM

## 2023-05-01 PROCEDURE — 77063 BREAST TOMOSYNTHESIS BI: CPT

## 2023-05-01 PROCEDURE — 76770 US EXAM ABDO BACK WALL COMP: CPT

## 2023-05-01 PROCEDURE — 71046 X-RAY EXAM CHEST 2 VIEWS: CPT

## 2023-05-01 PROCEDURE — 77067 SCR MAMMO BI INCL CAD: CPT

## 2023-05-01 PROCEDURE — 77080 DXA BONE DENSITY AXIAL: CPT

## 2023-05-01 NOTE — BD
EXAMINATION TYPE: Axial Bone Density

 

DATE OF EXAM: 5/1/2023

 

CLINICAL HISTORY: 68 years old Female.  ICD-10 CODE: Z13.820 SCREENING FOR OSTEOPOROSIS

 

Height:  61

Weight:  228.9

 

FRAX RISK QUESTIONS:

Alcohol (3 or more units per day):  no

Family History (Parent hip fracture):  no

Glucocorticoids (More than 3mos):  no

History of Fracture in Adulthood: no

 

Secondary Osteoporosis:

  1.  Type 1 Diabetes: no

  2.  Hyperthyroidism: no

  3.  Menopause before 45: no

  4.  Malnutrition: no

  5.  Chronic liver disease: no

Rheumatoid Arthritis: no

Current Tobacco Use: no

 

RISK FACTORS 

HISTORY OF: 

Hip Fracture (Right/Left): no

Spine Fracture: no

History of Wrist Fracture: no

Surgery to Spine/Hip(right/left)/Wrist (right/left): shunt in lumbar spine since 1991

Family History of Osteoporosis: sister x2

Active: somewhat

Diet low in dairy products/other sources of calcium:  yes

Postmenopausal woman: yes

Take estrogen and/or progesterone medications: no

Lost more than 2 inches in height since high school: no

Frequent falls: no

Poor Health: no

Hyperparathyroidism: no

Adrenal Insufficiency: no

 

MEDICATIONS: 

Prednisone or other steroids: no:

Thyroid Medications: no 

Osteoporosis Medications: no

Additional Medications: BP Meds, Cholesterol Meds, 

 

Additional History:

 

 

EXAM MEASUREMENTS: 

Bone mineral densitometry was performed using the [email or web address] System.

Bone mineral density as measured about the Lumbar spine is:  

----- L1-L4(G/cm2): 1.219

T Score Values are as follows:

----- L1: 0.8

----- L2: 1.1

----- L3: 0.1

----- L4: -0.8

----- L1-L4: 0.3

 

Z Score Values are as follows:

----- L1: 1.2

----- L2: 1.5

----- L3: 0.5

----- L4: -0.3

----- L1-L4: 0.8

Baseline study

 

Bone mineral density about the R hip (g/cm2): 1.012

Bone mineral density about the L hip (g/cm2): 1.055

T Score values are as follows:

-----R Neck: -1.4

-----L Neck: -1.1

-----R Total: 0.0

-----L Total: 0.4

 

Z Score values are as follows:

-----R Neck: -0.7

-----L Neck: -0.3

-----R Total: 0.4

-----L Total: 0.8

Baseline study

 

 

FRAX%s: The graph provided illustrates a 7.7% chance for a major osteoporotic fx and a 0.8% chance fo
r the hips probability for fx in 10 years time.

 

 

 

 

IMPRESSION:

Osteopenia (T Score between -2.5 and -1) femoral neck level in both hips.

 

There is slightly increased risk of fracture and the patient may be considered 

for treatment. 

 

Re-Screen 2-5 years.

 

NOTE:  T-SCORE=SD OF THE YOUNG ADULT MEAN.

## 2023-05-02 NOTE — MM
Reason for Exam: Screening  (asymptomatic). 

Baseline mammogram.





Patient History: 

Menarche at age 14. First Full-Term Pregnancy at age 20. Hysterectomy at age 45. Postmenopausal. 





Risk Values: 

Dolly 5 year model risk: 1.2%.

NCI Lifetime model risk: 4.4%.





Prior Study Comparison: 

Patient's first Mammogram. No prior studies available for comparison. 





Tissue Density: 

There are scattered fibroglandular densities.





Findings: 

Analyzed By CAD. 

Bilateral secretory and vascular calcifications are demonstrated. There is 6 mm circumscribed

nodularity anterior upper outer periareolar left breast for which further evaluation is recommended.

Otherwise, no significant mass or other discrete abnormality is seen. 





Overall Assessment: Incomplete: need additional imaging evaluation, BI-RAD 0





Management: 

Special View Mammogram of the left breast.

Diagnostic Breast Ultrasound of the left breast.

Additional views to include 3-D ML.  Based on localization on the true lateral view, targeted left

breast ultrasound.  [facility] will attempt to contact patient to return for

supplemental views and ultrasound if indicated.



Electronically signed and approved by: Ike Hussein M.D. Radiologist

## 2023-05-02 NOTE — US
EXAMINATION TYPE: US kidneys/renal and bladder

 

DATE OF EXAM: 5/1/2023

 

COMPARISON: [facility] 2016

 

CLINICAL INDICATION: Female, 68 years old with history of N28.9 RENAL INSUFFICIENCY; Renal insufficie
ncy

 

EXAM MEASUREMENTS:

 

Right Kidney:  12.6 x 6.6 x 6.1 cm

Left Kidney: Not visualized. 

 

 

 

Right Kidney: Appears slightly enlarged. No hydronephrosis or masses seen  

Left Kidney: Unable to visualize, LLQ was scanned as well, unable to visualize.  

Bladder: Appears anechoic.

**Bilateral Jets seen: No

 

 

 

IMPRESSION:

1.  No evidence for acute process.

2.  Nonvisualization of the left kidney.

## 2023-05-02 NOTE — XR
EXAMINATION TYPE: XR chest 2V

 

DATE OF EXAM: 5/1/2023

 

COMPARISON: NONE

 

TECHNIQUE: PA and lateral views submitted.

 

HISTORY: Hypercalcemia

 

FINDINGS:

The lungs are clear and  there is no pneumothorax, pleural effusion, or focal pneumonia.  Heart size 
normal and no overt failure. Osseous structures demonstrate hypertrophic and degenerative changes of 
the spine. Atherosclerotic change aorta. Hyperinflation suggests COPD.

 

IMPRESSION: 

1. No acute process.

## 2023-05-15 ENCOUNTER — HOSPITAL ENCOUNTER (OUTPATIENT)
Dept: HOSPITAL 47 - RADMAMWWP | Age: 68
Discharge: HOME | End: 2023-05-15
Attending: INTERNAL MEDICINE
Payer: COMMERCIAL

## 2023-05-15 DIAGNOSIS — R92.8: Primary | ICD-10-CM

## 2023-05-15 DIAGNOSIS — Z78.0: ICD-10-CM

## 2023-05-15 PROCEDURE — 77065 DX MAMMO INCL CAD UNI: CPT

## 2023-05-15 PROCEDURE — 77061 BREAST TOMOSYNTHESIS UNI: CPT

## 2023-05-15 NOTE — USB
Reason for Exam: Additional evaluation requested from abnormal screening. 





Patient History: 

Menarche at age 14. First Full-Term Pregnancy at age 20. Hysterectomy at age 45. Postmenopausal. 





Risk Values: 

Dolly 5 year model risk: 1.2%.

NCI Lifetime model risk: 4.4%.

Technique: 

Method: Targeted.  





Prior Study Comparison: 

5/1/2023 Bilateral MG 3D screening mammo w/cad, [facility]. 





Findings: 

The lateral section of the breast of the left breast, the axilla of the left breast and the

retroareolar of the left breast were scanned.

No solid or cystic masses are identified. No abnormality to correlate with the mammographic finding

is evident.. 





Overall Assessment: Probably benign, BI-RAD 3





Management: 

Diagnostic Mammogram of the left breast in 6 months.

A clinical breast exam by your physician is recommended on an annual basis and results should be

correlated with mammographic findings.  This exam should not preclude additional follow-up of

suspicious palpable abnormalities. Results were given to the patient verbally at the time of exam.



Electronically signed and approved by: Nitesh Henry D.O. Radiologis

## 2023-08-03 ENCOUNTER — HOSPITAL ENCOUNTER (OUTPATIENT)
Dept: HOSPITAL 47 - LABWHC1 | Age: 68
Discharge: HOME | End: 2023-08-03
Attending: INTERNAL MEDICINE
Payer: COMMERCIAL

## 2023-08-03 DIAGNOSIS — E83.52: Primary | ICD-10-CM

## 2023-08-03 LAB
ANION GAP SERPL CALC-SCNC: 11.1 MMOL/L (ref 4–12)
BUN SERPL-SCNC: 20.7 MG/DL (ref 9–27)
BUN/CREAT SERPL: 20.7 RATIO (ref 12–20)
CALCIUM SPEC-MCNC: 9.6 MG/DL (ref 8.7–10.3)
CHLORIDE SERPL-SCNC: 104 MMOL/L (ref 96–109)
CO2 SERPL-SCNC: 26.9 MMOL/L (ref 21.6–31.8)
GLUCOSE SERPL-MCNC: 136 MG/DL (ref 70–110)
POTASSIUM SERPL-SCNC: 4.6 MMOL/L (ref 3.5–5.5)
SODIUM SERPL-SCNC: 142 MMOL/L (ref 135–145)

## 2023-08-03 PROCEDURE — 80048 BASIC METABOLIC PNL TOTAL CA: CPT

## 2023-08-03 PROCEDURE — 83970 ASSAY OF PARATHORMONE: CPT

## 2023-08-03 PROCEDURE — 82330 ASSAY OF CALCIUM: CPT

## 2023-08-03 PROCEDURE — 36415 COLL VENOUS BLD VENIPUNCTURE: CPT

## 2025-03-12 NOTE — XR
EXAMINATION TYPE: XR knee limited RT

 

DATE OF EXAM: 3/21/2017 3:37 PM

 

COMPARISON: NONE

 

TECHNIQUE: 2 view submitted

 

HISTORY: Post op

 

FINDINGS:

There is a prosthetic  knee in near anatomic alignment.  There is soft tissue edema and emphysema. 

 

IMPRESSION:

1. Postoperative change.  Appears in near-anatomic alignment
Statement Selected